# Patient Record
Sex: MALE
[De-identification: names, ages, dates, MRNs, and addresses within clinical notes are randomized per-mention and may not be internally consistent; named-entity substitution may affect disease eponyms.]

---

## 2020-12-24 ENCOUNTER — TRANSCRIPTION ENCOUNTER (OUTPATIENT)
Age: 40
End: 2020-12-24

## 2024-03-28 ENCOUNTER — APPOINTMENT (OUTPATIENT)
Dept: ORTHOPEDIC SURGERY | Facility: CLINIC | Age: 44
End: 2024-03-28
Payer: COMMERCIAL

## 2024-03-28 VITALS — HEIGHT: 70 IN | BODY MASS INDEX: 27.06 KG/M2 | WEIGHT: 189 LBS

## 2024-03-28 DIAGNOSIS — M76.61 ACHILLES TENDINITIS, RIGHT LEG: ICD-10-CM

## 2024-03-28 DIAGNOSIS — Z78.9 OTHER SPECIFIED HEALTH STATUS: ICD-10-CM

## 2024-03-28 DIAGNOSIS — R29.898 OTHER SYMPTOMS AND SIGNS INVOLVING THE MUSCULOSKELETAL SYSTEM: ICD-10-CM

## 2024-03-28 DIAGNOSIS — M25.671 STIFFNESS OF RIGHT ANKLE, NOT ELSEWHERE CLASSIFIED: ICD-10-CM

## 2024-03-28 DIAGNOSIS — Z00.00 ENCOUNTER FOR GENERAL ADULT MEDICAL EXAMINATION W/OUT ABNORMAL FINDINGS: ICD-10-CM

## 2024-03-28 PROCEDURE — 99203 OFFICE O/P NEW LOW 30 MIN: CPT | Mod: 25

## 2024-03-28 PROCEDURE — 73610 X-RAY EXAM OF ANKLE: CPT | Mod: RT

## 2024-03-28 RX ORDER — MELOXICAM 15 MG/1
15 TABLET ORAL DAILY
Qty: 30 | Refills: 2 | Status: COMPLETED | COMMUNITY
Start: 2024-03-28 | End: 1900-01-01

## 2024-03-28 NOTE — DISCUSSION/SUMMARY
[de-identified] : WBAT in supportive footwear, heel lifts.  Recommend a course of PT. Ice to affected area. Stretching exercises recommended and demonstrated to patient. Avoid high impact activities.  The patient was explained the options as well as benefits of over the counter verses prescription strength nonsteroidal anti-inflammatory medication. The patient opts for a prescription strength medication.

## 2024-03-28 NOTE — HISTORY OF PRESENT ILLNESS
[Sudden] : sudden [6] : 6 [Localized] : localized [Tightness] : tightness [Ice] : ice [Full time] : Work status: full time [de-identified] : Pt. is a 43 year old male who presents for evaluation of his RT ankle. Denies trauma. Symptoms since 2/29/24. Ice to affected area. Had been using CAM boot he had from previous LT ankle issue. WB without assistive device.  [] : no [FreeTextEntry1] : rt achilles [FreeTextEntry3] : 2-29-24 [FreeTextEntry5] : pt woke up with pain, no injury  but Jan 2022 woke up w tightness as well but in left leg/achilles [FreeTextEntry6] : constantly feels pressure [FreeTextEntry9] : tries to elevate and uses a tall boot from 2022 [de-identified] : weight bearing  [de-identified] : [employer]

## 2024-05-09 ENCOUNTER — APPOINTMENT (OUTPATIENT)
Dept: ORTHOPEDIC SURGERY | Facility: CLINIC | Age: 44
End: 2024-05-09

## 2024-09-16 ENCOUNTER — NON-APPOINTMENT (OUTPATIENT)
Age: 44
End: 2024-09-16

## 2024-09-18 ENCOUNTER — APPOINTMENT (OUTPATIENT)
Dept: ORTHOPEDIC SURGERY | Facility: CLINIC | Age: 44
End: 2024-09-18
Payer: COMMERCIAL

## 2024-09-18 ENCOUNTER — NON-APPOINTMENT (OUTPATIENT)
Age: 44
End: 2024-09-18

## 2024-09-18 DIAGNOSIS — S83.411A SPRAIN OF MEDIAL COLLATERAL LIGAMENT OF RIGHT KNEE, INITIAL ENCOUNTER: ICD-10-CM

## 2024-09-18 PROCEDURE — 73564 X-RAY EXAM KNEE 4 OR MORE: CPT | Mod: 26,RT

## 2024-09-18 PROCEDURE — 99214 OFFICE O/P EST MOD 30 MIN: CPT

## 2024-09-18 NOTE — DISCUSSION/SUMMARY
[de-identified] : I had a lengthy discussion with the patient regarding their current condition. We discussed the treatment options including operative and nonoperative management. At this time I recommended an MRI of his right knee.  He is given a supportive knee brace.  He can continue with naproxen, GI precautions were reviewed.  Cryotherapy and Tylenol were also recommended.  He will follow-up with Dr. Cee after completion of his MRI.  All questions were answered

## 2024-09-18 NOTE — PHYSICAL EXAM
[de-identified] : Physical Exam:  General: Well appearing, no acute distress  Neurologic: A&Ox3, No focal deficits  Head: NCAT without abrasions, lacerations, or ecchymosis to head, face, or scalp  Eyes: No scleral icterus, no gross abnormalities  Respiratory: Equal chest wall expansion bilaterally, no accessory muscle use  Lymphatic: No lymphadenopathy palpated  Skin: Warm and dry  Psychiatric: Normal mood and affect  Examination of the right knee reveals no significant effusion, erythema or ecchymosis.  There are no leg length discrepancies appreciated. The right knee is slightly larger than the left.  The patient's range of motion is from 0-115 limited by pain with crepitus through the motion.  The patient has no pain with patella mobility or apprehension.  The patient displays tenderness to palpation in the medial and lateral joint lines but more so in the medial joint line.  There is no patella facet tenderness.  The patient has a 4.5 out of 5 strength resisted straight leg raising as well as knee flexion and extension.  The knee is stable to Lachman testing, as well as anterior and posterior drawer.  There is no valgus or varus instability. The patient has pain with Yanna and Grind Testing.  The calf and thigh are soft, supple and nontender.  The patient is grossly neurovascularly intact distally.  Examination of the left knee reveals no significant effusion, erythema or ecchymosis.  There are no leg length discrepancies appreciated. The patient's range of motion is from 0-130.  The knee is stable to Lachman testing, as well as anterior and posterior drawer.  There is no valgus or varus instability. The calf and thigh are soft, supple and nontender.  The patient is grossly neurovascularly intact distally. [de-identified] : 4 views of right knee were performed today and available for me to review. Results were discussed with the patient. They demonstrate no f/x, dislocation, or other deformity.

## 2024-09-18 NOTE — HISTORY OF PRESENT ILLNESS
[Worsening] : worsening [___ days] : [unfilled] day(s) ago [de-identified] : WYATT FLORES is a 44 year old male being seen for right knee pain. He states he initially felt pain 2 days ago while performing a jog after a workout. He reports while he was running, he felt immense pain in his right knee that prompted him to stop. He points to the medial aspect of his knee as the point of his pain. He states he is having difficulty walking, bending his knee, and has decreased ADL functions. He complains of buckling in his knee and states he is unable to put weight on the involved leg without experiencing pain. He states he went to Urgent Care where he received a prescription for Naproxen which he states he has been taking. He states he has been icing and elevation his leg. He presents today ambulating with a cane.

## 2024-09-27 ENCOUNTER — OUTPATIENT (OUTPATIENT)
Dept: OUTPATIENT SERVICES | Facility: HOSPITAL | Age: 44
LOS: 1 days | End: 2024-09-27
Payer: COMMERCIAL

## 2024-09-27 ENCOUNTER — APPOINTMENT (OUTPATIENT)
Dept: MRI IMAGING | Facility: CLINIC | Age: 44
End: 2024-09-27

## 2024-09-27 DIAGNOSIS — Z00.8 ENCOUNTER FOR OTHER GENERAL EXAMINATION: ICD-10-CM

## 2024-09-27 PROCEDURE — 73721 MRI JNT OF LWR EXTRE W/O DYE: CPT | Mod: 26,RT

## 2024-09-27 PROCEDURE — 73721 MRI JNT OF LWR EXTRE W/O DYE: CPT

## 2024-09-30 ENCOUNTER — APPOINTMENT (OUTPATIENT)
Dept: ORTHOPEDIC SURGERY | Facility: CLINIC | Age: 44
End: 2024-09-30
Payer: COMMERCIAL

## 2024-09-30 DIAGNOSIS — S83.241D OTHER TEAR OF MEDIAL MENISCUS, CURRENT INJURY, RIGHT KNEE, SUBSEQUENT ENCOUNTER: ICD-10-CM

## 2024-09-30 PROCEDURE — 20611 DRAIN/INJ JOINT/BURSA W/US: CPT | Mod: RT

## 2024-09-30 PROCEDURE — 99204 OFFICE O/P NEW MOD 45 MIN: CPT | Mod: 25

## 2024-09-30 NOTE — PHYSICAL EXAM
[de-identified] : Physical Exam: General: Well appearing, no acute distress Neurologic: A&Ox3, No focal deficits Head: NCAT without abrasions, lacerations, or ecchymosis to head, face, or scalp Eyes: No scleral icterus, no gross abnormalities Respiratory: Equal chest wall expansion bilaterally, no accessory muscle use Lymphatic: No lymphadenopathy palpated Skin: Warm and dry Psychiatric: Normal mood and affect  The right knee is examined and reveals mild effusion. No ecchymosis, erythema, or deformity. The patients range of motion is from 0-130 degrees pain free and fluid. There is no crepitus felt. The patient has tenderness to palpation in the medial joint line. Nontender over the lateral joint line. There is no patellar facet tenderness. The patient has 5/5 strength to resisted hip flexion, VMO isolation, knee flexion and extension. The patient is stable to anterior and posterior draw. The patient is stable to Lochman testing. There is no valgus or varus ligamentous instability. Negative Yanna and Grind tests. There is no pain with patellar mobility or apprehension testing. The calf and thigh are soft, supple, and nontender. The patient is grossly neurovascularly intact distally. [de-identified] : MRI [facility] EXAM: [id number] - MR KNEE RT  - ORDERED BY:  MERRY AMES PROCEDURE DATE:  09/27/2024  IMPRESSION: 1.  Horizontal tear of the medial meniscus 2.  Small joint effusion.

## 2024-09-30 NOTE — DISCUSSION/SUMMARY
[de-identified] : We had a thorough discussion regarding the nature of his pain, the pathophysiology, as well as all treatment options. I discussed operative and non-operative treatment modalities. All risks, benefits and alternatives to the proposed surgical procedure, right knee arthroscopy with meniscus repair versus meniscectomy, were discussed in great detail with the patient. Risks include but are not limited to pain, bleeding, infection, stiffness, medical complications (including DVT, PE, MI), and risks of anesthesia. The patient is not electing to proceed with the procedure yet but was given a surgical information packet to review. Pt due to his acute pain elected for a corticosteroid injection at today's visit and tolerated the procedure well. He should take it easy for the next 2-3 days while icing the joint. Patient was given prescription of formal physical therapy that he will perform 2x/wk for 6-8 wks. All questions were answered and the patient verbalized understanding. The patient is in agreement with this treatment plan. Patient will follow up in 6-8 wks for repeat clinical assessment.

## 2024-09-30 NOTE — HISTORY OF PRESENT ILLNESS
[de-identified] : WYATT FLORES is a 44 year old male being seen for an MRI review of his right knee from [facility]. He reports he has been doing physical therapy and states his knee pain has improved. States that he was doing dead lifts and running and recently and while cooling down and doing a jog he took one step and felt something off and tweaked his R knee. States he has been limping ever since injury. Presents today with knee brace.

## 2024-09-30 NOTE — CONSULT LETTER
[Dear  ___] : Dear  [unfilled], [Consult Letter:] : I had the pleasure of evaluating your patient, [unfilled]. [Please see my note below.] : Please see my note below. [Sincerely,] : Sincerely, [FreeTextEntry3] : Dr. Jeffy Cee

## 2024-09-30 NOTE — ADDENDUM
[FreeTextEntry1] : Documented by Jillian Weathers acting as a scribe for Dr. Cee and Evgeny Montgomery PA-C on 09/30/2024. All medical record entries made by the Scribe were at my, Dr. Cee, and Evgeny Montgomery's, direction and personally dictated by me on 09/30/2024. I have reviewed the chart and agree that the record accurately reflects my personal performance of the history, physical exam, procedure and imaging.

## 2024-09-30 NOTE — DISCUSSION/SUMMARY
[de-identified] : We had a thorough discussion regarding the nature of his pain, the pathophysiology, as well as all treatment options. I discussed operative and non-operative treatment modalities. All risks, benefits and alternatives to the proposed surgical procedure, right knee arthroscopy with meniscus repair versus meniscectomy, were discussed in great detail with the patient. Risks include but are not limited to pain, bleeding, infection, stiffness, medical complications (including DVT, PE, MI), and risks of anesthesia. The patient is not electing to proceed with the procedure yet but was given a surgical information packet to review. Pt due to his acute pain elected for a corticosteroid injection at today's visit and tolerated the procedure well. He should take it easy for the next 2-3 days while icing the joint. Patient was given prescription of formal physical therapy that he will perform 2x/wk for 6-8 wks. All questions were answered and the patient verbalized understanding. The patient is in agreement with this treatment plan. Patient will follow up in 6-8 wks for repeat clinical assessment.

## 2024-09-30 NOTE — HISTORY OF PRESENT ILLNESS
[de-identified] : WYATT FLORES is a 44 year old male being seen for an MRI review of his right knee from [facility]. He reports he has been doing physical therapy and states his knee pain has improved. States that he was doing dead lifts and running and recently and while cooling down and doing a jog he took one step and felt something off and tweaked his R knee. States he has been limping ever since injury. Presents today with knee brace.

## 2024-09-30 NOTE — PROCEDURE
[de-identified] : Injection: US guided Right Knee Joint  A discussion was had with the patient regarding this procedure and all questions were answered. All risks, benefits and alternatives were discussed. These included but were not limited to bleeding, infection, and allergic reaction. Alcohol was used to clean the skin, and chlorhexidine was used to sterilize and prep the area in the supero-lateral aspect of the Right knee. Ethyl chloride spray was then used as a topical anesthetic. A 22-gauge needle was used to inject 3 cc of 1% Xylocaine, 2 cc of 0.25% Bupivacaine, and 1 cc of 40 mg/mL Triamcinolone Acetonide into the Right knee. Ultrasound guidance was used for adequate placement of needle. A sterile bandage was then applied. The patient tolerated the procedure well and there were no complications. Post injection instructions were given.

## 2024-09-30 NOTE — PHYSICAL EXAM
[de-identified] : Physical Exam: General: Well appearing, no acute distress Neurologic: A&Ox3, No focal deficits Head: NCAT without abrasions, lacerations, or ecchymosis to head, face, or scalp Eyes: No scleral icterus, no gross abnormalities Respiratory: Equal chest wall expansion bilaterally, no accessory muscle use Lymphatic: No lymphadenopathy palpated Skin: Warm and dry Psychiatric: Normal mood and affect  The right knee is examined and reveals mild effusion. No ecchymosis, erythema, or deformity. The patients range of motion is from 0-130 degrees pain free and fluid. There is no crepitus felt. The patient has tenderness to palpation in the medial joint line. Nontender over the lateral joint line. There is no patellar facet tenderness. The patient has 5/5 strength to resisted hip flexion, VMO isolation, knee flexion and extension. The patient is stable to anterior and posterior draw. The patient is stable to Lochman testing. There is no valgus or varus ligamentous instability. Negative Yanna and Grind tests. There is no pain with patellar mobility or apprehension testing. The calf and thigh are soft, supple, and nontender. The patient is grossly neurovascularly intact distally. [de-identified] : MRI [facility] EXAM: [id number] - MR KNEE RT  - ORDERED BY:  MERRY AMES PROCEDURE DATE:  09/27/2024  IMPRESSION: 1.  Horizontal tear of the medial meniscus 2.  Small joint effusion.

## 2024-09-30 NOTE — PROCEDURE
[de-identified] : Injection: US guided Right Knee Joint  A discussion was had with the patient regarding this procedure and all questions were answered. All risks, benefits and alternatives were discussed. These included but were not limited to bleeding, infection, and allergic reaction. Alcohol was used to clean the skin, and chlorhexidine was used to sterilize and prep the area in the supero-lateral aspect of the Right knee. Ethyl chloride spray was then used as a topical anesthetic. A 22-gauge needle was used to inject 3 cc of 1% Xylocaine, 2 cc of 0.25% Bupivacaine, and 1 cc of 40 mg/mL Triamcinolone Acetonide into the Right knee. Ultrasound guidance was used for adequate placement of needle. A sterile bandage was then applied. The patient tolerated the procedure well and there were no complications. Post injection instructions were given.

## 2024-10-10 NOTE — REVIEW OF SYSTEMS
Is This A New Presentation, Or A Follow-Up?: Rash [Joint Pain] : joint pain [Joint Swelling] : joint swelling [Negative] : Endocrine

## 2024-11-14 ENCOUNTER — APPOINTMENT (OUTPATIENT)
Dept: ORTHOPEDIC SURGERY | Facility: CLINIC | Age: 44
End: 2024-11-14